# Patient Record
Sex: FEMALE | Race: WHITE | NOT HISPANIC OR LATINO
[De-identification: names, ages, dates, MRNs, and addresses within clinical notes are randomized per-mention and may not be internally consistent; named-entity substitution may affect disease eponyms.]

---

## 2024-03-20 ENCOUNTER — NON-APPOINTMENT (OUTPATIENT)
Age: 39
End: 2024-03-20

## 2024-03-20 PROBLEM — Z00.00 ENCOUNTER FOR PREVENTIVE HEALTH EXAMINATION: Status: ACTIVE | Noted: 2024-03-20

## 2024-03-21 ENCOUNTER — APPOINTMENT (OUTPATIENT)
Dept: RHEUMATOLOGY | Facility: CLINIC | Age: 39
End: 2024-03-21
Payer: COMMERCIAL

## 2024-03-21 VITALS
DIASTOLIC BLOOD PRESSURE: 76 MMHG | OXYGEN SATURATION: 99 % | WEIGHT: 171 LBS | SYSTOLIC BLOOD PRESSURE: 106 MMHG | TEMPERATURE: 97.8 F | HEART RATE: 99 BPM | BODY MASS INDEX: 25.91 KG/M2 | HEIGHT: 68 IN

## 2024-03-21 DIAGNOSIS — L65.9 NONSCARRING HAIR LOSS, UNSPECIFIED: ICD-10-CM

## 2024-03-21 DIAGNOSIS — R76.8 OTHER SPECIFIED ABNORMAL IMMUNOLOGICAL FINDINGS IN SERUM: ICD-10-CM

## 2024-03-21 DIAGNOSIS — Z78.9 OTHER SPECIFIED HEALTH STATUS: ICD-10-CM

## 2024-03-21 PROCEDURE — 99204 OFFICE O/P NEW MOD 45 MIN: CPT

## 2024-03-21 PROCEDURE — 36415 COLL VENOUS BLD VENIPUNCTURE: CPT

## 2024-03-21 RX ORDER — AMOXICILLIN 875 MG/1
TABLET, FILM COATED ORAL
Refills: 0 | Status: ACTIVE | COMMUNITY

## 2024-03-21 NOTE — ADDENDUM
[FreeTextEntry1] : I, Jermaine Quiñones, documented this note as a scribe on behalf of Dr. Julianna Luna MD on 03/21/2024.

## 2024-03-21 NOTE — PHYSICAL EXAM
[General Appearance - In No Acute Distress] : in no acute distress [General Appearance - Alert] : alert [General Appearance - Well Developed] : well developed [General Appearance - Well Nourished] : well nourished [General Appearance - Well-Appearing] : healthy appearing [Sclera] : the sclera and conjunctiva were normal [Exaggerated Use Of Accessory Muscles For Inspiration] : no accessory muscle use [Respiration, Rhythm And Depth] : normal respiratory rhythm and effort [Abnormal Walk] : normal gait [Edema] : there was no peripheral edema [] : no rash [Musculoskeletal - Swelling] : no joint swelling seen [Oriented To Time, Place, And Person] : oriented to person, place, and time [Impaired Insight] : insight and judgment were intact [Affect] : the affect was normal [Auscultation Breath Sounds / Voice Sounds] : lungs were clear to auscultation bilaterally [Heart Rate And Rhythm] : heart rate was normal and rhythm regular [Heart Sounds] : normal S1 and S2 [Murmurs] : no murmurs [Examination Of The Oral Cavity] : the lips and gums were normal [Oropharynx] : the oropharynx was normal [Nail Clubbing] : no clubbing  or cyanosis of the fingernails [Motor Tone] : muscle strength and tone were normal [Mood] : the mood was normal [FreeTextEntry1] : No active synovitis of the upper and lower extremities bilaterally.

## 2024-03-21 NOTE — HISTORY OF PRESENT ILLNESS
[FreeTextEntry1] : March 21, 2024 38 year old woman referred for rheumatology evaluation. Patient noted to have positive BROOKE during dermatology evaluation and referred to rheumatology for further evaluation.  Patient with hair thinning, initially thought related to pregnancy, but felt persisting, as now one year post partum Hair loss is more thinning and not associated with patches, with some regrowth recently Patient also reports bad knees for whole life, cannot sit or stand for long without pain, immediate tension in knees after standing up Patient reports cold extremities, without change in color  Reports dry skin, and some dry eyes but does not use eye drops, able to wear contacts, although does report a corneal abrasion from contact For one month post partum, patient had pain in right thumb, has not recurred since that time No dry mouth No increased cavities No oral ulcers No photosensitivity No joint swelling or stiffness No fevers  Patient takes vitamins Taking amoxicillin for strep at this time, day 5/10 Has not taken birth control for a very long time  8 years ago, had stomach aches post eating gluten and was evaluated by GI, celiac work up negative, but felt better after stopping gluten for 5 years, reintroduced gluten into diet after first pregnancy with no further issues Patient has two children aged 1 and 3, both uncomplicated pregnancies with vaginal birth, slightly elevated blood pressure during first pregnancy but did not take aspirin, no proteinuria noted  Evaluated by dermatology for hair loss, reviewed labs from that time; of note positive BROOKE and dsDNA 22, all other serologies negative Recent celiac panel normal Patient is no longer breast feeding No family history of SLE, RA or thyroid disease Family history of Crohn's disease (mother) Covid vaccine up to date, patient has also had covid infection in the past No smoking Patient exercises, able to run without being limited by knee pain

## 2024-03-21 NOTE — END OF VISIT
[FreeTextEntry3] : All medical record entries made by the Scribe were at my, Dr. Julianna Luna MD, direction and personally dictated by me on 03/21/2024. I have reviewed the chart and agree that the record accurately reflects my personal performance of the history, physical exam, assessment and plan. I have also personally directed, reviewed, and agreed with the chart. [Time Spent: ___ minutes] : I have spent [unfilled] minutes of time on the encounter.

## 2024-03-21 NOTE — DATA REVIEWED
[FreeTextEntry1] : Feb 21, 2024 TSH 2.5 BROOKE pos dsDNA 22 all other serologies neg ferritin 30 Jan 2024 CBC normal CMP normal except K: 5.0 A1c 5 Celiac neg

## 2024-03-21 NOTE — ASSESSMENT
[FreeTextEntry1] : 38 year old woman self-referred for rheumatology evaluation. Patient noted to have positive BROOKE and positive anti dsDNA during dermatology evaluation for alopecia in February 2024. Review of results notes all other serologies negative. Alopecia more hair thinning, no alopecia areata or discoid lesions noted; patient feels some new hair growth noted at the frontal hairline as well. At this time, patient does not meet criteria for an underlying connective tissue disease, although does have serologic marker for SLE in positive BROOKE and anti DsDNA.  Will continue to monitor for now, will obtain repeat serologies today in office as well in addition to antiphospholipid antibodies, RF, CCP, complements, and urine protein/creatinine ratio.  Further management pending results.

## 2024-03-22 LAB
C3 SERPL-MCNC: 117 MG/DL
C4 SERPL-MCNC: 27 MG/DL
CREAT SPEC-SCNC: 365 MG/DL
CREAT/PROT UR: 0.1 RATIO
ENA RNP AB SER IA-ACNC: 0.2 AL
ENA SM AB SER IA-ACNC: <0.2 AL
ENA SS-A AB SER IA-ACNC: <0.2 AL
ENA SS-B AB SER IA-ACNC: <0.2 AL
PROT UR-MCNC: 27 MG/DL
RHEUMATOID FACT SER QL: <10 IU/ML
THYROGLOB AB SERPL-ACNC: <20 IU/ML
THYROPEROXIDASE AB SERPL IA-ACNC: <10 IU/ML

## 2024-03-24 LAB
B2 GLYCOPROT1 IGG SER-ACNC: <5 SGU
B2 GLYCOPROT1 IGM SER-ACNC: 7.6 SMU
CCP AB SER IA-ACNC: <8 UNITS
DSDNA AB SER-ACNC: <12 IU/ML
RF+CCP IGG SER-IMP: NEGATIVE

## 2024-03-29 ENCOUNTER — TRANSCRIPTION ENCOUNTER (OUTPATIENT)
Age: 39
End: 2024-03-29

## 2024-04-01 ENCOUNTER — TRANSCRIPTION ENCOUNTER (OUTPATIENT)
Age: 39
End: 2024-04-01

## 2024-04-02 ENCOUNTER — TRANSCRIPTION ENCOUNTER (OUTPATIENT)
Age: 39
End: 2024-04-02

## 2024-04-02 LAB
ANA SER IF-ACNC: NEGATIVE
CARDIOLIPIN IGM SER-MCNC: 6.4 MPL
CARDIOLIPIN IGM SER-MCNC: <5 GPL

## 2025-03-27 ENCOUNTER — APPOINTMENT (OUTPATIENT)
Dept: RHEUMATOLOGY | Facility: CLINIC | Age: 40
End: 2025-03-27

## 2025-04-02 ENCOUNTER — APPOINTMENT (OUTPATIENT)
Dept: RHEUMATOLOGY | Facility: CLINIC | Age: 40
End: 2025-04-02
Payer: COMMERCIAL

## 2025-04-02 ENCOUNTER — NON-APPOINTMENT (OUTPATIENT)
Age: 40
End: 2025-04-02

## 2025-04-02 VITALS
WEIGHT: 172 LBS | TEMPERATURE: 97.3 F | HEART RATE: 71 BPM | HEIGHT: 68 IN | OXYGEN SATURATION: 98 % | SYSTOLIC BLOOD PRESSURE: 114 MMHG | DIASTOLIC BLOOD PRESSURE: 81 MMHG | BODY MASS INDEX: 26.07 KG/M2

## 2025-04-02 DIAGNOSIS — R76.8 OTHER SPECIFIED ABNORMAL IMMUNOLOGICAL FINDINGS IN SERUM: ICD-10-CM

## 2025-04-02 PROCEDURE — 99214 OFFICE O/P EST MOD 30 MIN: CPT

## 2025-04-02 PROCEDURE — G2211 COMPLEX E/M VISIT ADD ON: CPT | Mod: NC

## 2025-04-02 PROCEDURE — 36415 COLL VENOUS BLD VENIPUNCTURE: CPT

## 2025-04-08 LAB
ANA SER IF-ACNC: NEGATIVE
C3 SERPL-MCNC: 113 MG/DL
C4 SERPL-MCNC: 20 MG/DL
DSDNA AB SER-ACNC: 23 IU/ML
ENA RNP AB SER IA-ACNC: 0.2 AL
ENA SCL70 IGG SER IA-ACNC: <0.2 AL
ENA SM AB SER IA-ACNC: <0.2 AL
ENA SS-A AB SER IA-ACNC: <0.2 AL
ENA SS-B AB SER IA-ACNC: <0.2 AL